# Patient Record
Sex: FEMALE | Race: WHITE | NOT HISPANIC OR LATINO | URBAN - NONMETROPOLITAN AREA
[De-identification: names, ages, dates, MRNs, and addresses within clinical notes are randomized per-mention and may not be internally consistent; named-entity substitution may affect disease eponyms.]

---

## 2020-03-10 ENCOUNTER — IMPORTED ENCOUNTER (OUTPATIENT)
Dept: URBAN - NONMETROPOLITAN AREA CLINIC 1 | Facility: CLINIC | Age: 50
End: 2020-03-10

## 2020-03-10 PROBLEM — H52.13: Noted: 2020-03-10

## 2020-03-10 PROBLEM — H52.4: Noted: 2020-03-10

## 2020-03-10 PROCEDURE — 92015 DETERMINE REFRACTIVE STATE: CPT

## 2020-03-10 PROCEDURE — 92004 COMPRE OPH EXAM NEW PT 1/>: CPT

## 2020-03-10 NOTE — PATIENT DISCUSSION
Simple Myopic OU w/Presbyopia-  discussed findings w/patient-  new spectacle Rx issued-  monitor yearly or prn Note: discussed with patient her Rx remained stable and there are no signs of cataracts at this time. Will continue to monitor at this time.

## 2021-08-05 ENCOUNTER — IMPORTED ENCOUNTER (OUTPATIENT)
Dept: URBAN - NONMETROPOLITAN AREA CLINIC 1 | Facility: CLINIC | Age: 51
End: 2021-08-05

## 2021-08-05 PROBLEM — H52.4: Noted: 2021-08-05

## 2021-08-05 PROBLEM — H52.223: Noted: 2021-08-05

## 2021-08-05 PROBLEM — H52.13: Noted: 2020-03-10

## 2021-08-05 PROCEDURE — 92310 CONTACT LENS FITTING OU: CPT

## 2021-08-05 PROCEDURE — 92015 DETERMINE REFRACTIVE STATE: CPT

## 2021-08-05 PROCEDURE — 92014 COMPRE OPH EXAM EST PT 1/>: CPT

## 2021-08-05 NOTE — PATIENT DISCUSSION
Compound Myopic Astigmatism OU w/Presbyopia-  discussed findings w/patient-  new spectacle Rx issued-  monitor yearly or prn; 's Notes: MR 8/5/2021DFE defer

## 2022-04-09 ASSESSMENT — VISUAL ACUITY
OS_GLARE: 20/25
OU_CC: 20/20
OD_SC: 20/20
OS_SC: 20/20
OD_GLARE: 20/25
OS_SC: 20/20-

## 2022-04-09 ASSESSMENT — TONOMETRY
OD_IOP_MMHG: 19
OD_IOP_MMHG: 17
OS_IOP_MMHG: 18
OS_IOP_MMHG: 19